# Patient Record
Sex: MALE | Race: WHITE | NOT HISPANIC OR LATINO | Employment: FULL TIME | URBAN - METROPOLITAN AREA
[De-identification: names, ages, dates, MRNs, and addresses within clinical notes are randomized per-mention and may not be internally consistent; named-entity substitution may affect disease eponyms.]

---

## 2017-02-09 ENCOUNTER — OFFICE VISIT (OUTPATIENT)
Dept: SURGERY | Facility: CLINIC | Age: 22
End: 2017-02-09

## 2017-02-09 VITALS
OXYGEN SATURATION: 99 % | BODY MASS INDEX: 16.25 KG/M2 | SYSTOLIC BLOOD PRESSURE: 112 MMHG | HEIGHT: 74 IN | DIASTOLIC BLOOD PRESSURE: 60 MMHG | WEIGHT: 126.6 LBS | HEART RATE: 75 BPM

## 2017-02-09 DIAGNOSIS — R10.84 GENERALIZED ABDOMINAL PAIN: ICD-10-CM

## 2017-02-09 DIAGNOSIS — R11.15 NON-INTRACTABLE CYCLICAL VOMITING WITH NAUSEA: Primary | ICD-10-CM

## 2017-02-09 PROCEDURE — 99213 OFFICE O/P EST LOW 20 MIN: CPT | Performed by: SURGERY

## 2017-02-09 NOTE — PROGRESS NOTES
Chief complaint:  Post-op  Follow up    History of Present Illness    This is Agapito Briseno 21 y.o. status post *** and is doing very well.  Patient denies fever, chills, nausea or vomiting.  Patient's pain is well-controlled.      The following portions of the patient's history were reviewed and updated as appropriate: allergies, current medications, past family history, past medical history, past social history, past surgical history and problem list.    Physical Exam  Incision is well-healed without evidence of {No wound infection:69251}.    Patient does not use tobacco products currently and I have counseled the patient not to start using tobacco products in the future.    Assessment/plan:    This is Agapito Briseno 21 y.o. status post *** and is doing very well.  I have instructed the patient not lift greater than 10 pounds for total of 6 weeks from the time of surgery. I have instructed the patient follow-up as needed.    Jaqueline Marc MD  General, Minimally Invasive and Endoscopic Surgery  [facility]    [facility], [address]  [place], [place], [address]  P: [phone]  F: [phone]    Cc:  Jw Jenkins MD

## 2017-02-13 ENCOUNTER — HOSPITAL ENCOUNTER (OUTPATIENT)
Dept: CT IMAGING | Facility: HOSPITAL | Age: 22
Discharge: HOME | End: 2017-02-13
Attending: SURGERY

## 2017-02-14 ENCOUNTER — RESULTS ENCOUNTER (OUTPATIENT)
Dept: SURGERY | Facility: CLINIC | Age: 22
End: 2017-02-14

## 2017-02-14 DIAGNOSIS — R11.15 NON-INTRACTABLE CYCLICAL VOMITING WITH NAUSEA: ICD-10-CM

## 2017-02-22 NOTE — PROGRESS NOTES
"Chief complaint: Abdominal pain, nausea and vomiting     Patient is a 21 y.o. male referred by Jw Jenkins MD for evaluation of persistent nausea and abdominal pain.  Patient has had an upper and lower scope in the past which was negative.  Patient has had a laparoscopic cholecystectomy August 23, 2016 for biliary dyskinesia.  Patient is on omeprazole and has tried Nexium and Carafate nothing seems to help.  Pain is worse at night and nausea.  Phenergan is the only thing that seems to help.  Patient denies ever having a CAT scan of the abdomen or pelvis.  Patient denies fever, chills, or previous history of cancer.     Past Medical History   Diagnosis Date   • Back pain    • Constipation    • GERD (gastroesophageal reflux disease)    • Nauseated      Past Surgical History   Procedure Laterality Date   • Cholecystectomy       Family History   Problem Relation Age of Onset   • Thyroid disease Mother    • Hypertension Father      Social History   Substance Use Topics   • Smoking status: Current Every Day Smoker   • Smokeless tobacco: Never Used   • Alcohol use No         Current Outpatient Prescriptions:   •  esomeprazole (NexIUM) 40 MG capsule, Take 1 capsule by mouth 2 (two) times a day., Disp: 180 capsule, Rfl: 3  •  promethazine (PHENERGAN) 25 MG tablet, Take 1 tablet by mouth Every 6 (Six) Hours As Needed for nausea or vomiting., Disp: 20 tablet, Rfl: 2    Review of Systems   Same as previous office visit  All other systems have been reviewed and are negative.  Vitals:    02/09/17 1506   BP: 112/60   Pulse: 75   SpO2: 99%   Weight: 126 lb 9.6 oz (57.4 kg)   Height: 74\" (188 cm)       Physical Exam  General/physcological:   Alert and oriented x3 in no acute distress  HEENT: Normal cephalic, atraumatic, PERRLA, EOMI, sclera anicteric, moist mucous membranes, neck is supple, no JVD, no carotid bruits, no thyromegaly no adenopathy  Respiratory: CTA and percussion  CVA: RRR, normal S1-S2, no murmurs, no " gallops or rubs  GI: Positive BS, soft, nondistended, nontender, no rebound, no guarding, no hernias, no organomegaly and no masses  Musculoskeletal: Full range of motion, no clubbing, no cyanosis or edema  Neurovascular: Grossly intact    Patient does use tobacco products currently and I have counseled the patient to stop using tobacco products.    Assessment:  Abdominal pain and nausea  Plan:  I will further evaluate with a CT scan of the abdomen and pelvis.  I have discussed this with the patient in detail.  Further recommendations will be made upon the results of the CAT scan.    Jaqueline Marc MD  General, Minimally Invasive and Endoscopic Surgery  [facility]    [address], [address]  [place], [place], [address]  P: [phone]  F: [phone]    Cc:  Jw Jenkins MD

## 2022-07-22 ENCOUNTER — TELEPHONE (OUTPATIENT)
Dept: FAMILY MEDICINE CLINIC | Facility: CLINIC | Age: 27
End: 2022-07-22

## 2022-07-22 RX ORDER — ESOMEPRAZOLE MAGNESIUM 40 MG/1
40 CAPSULE, DELAYED RELEASE ORAL
Qty: 180 CAPSULE | Refills: 3 | OUTPATIENT
Start: 2022-07-22

## 2022-07-22 NOTE — TELEPHONE ENCOUNTER
Caller: Agapito Briseno    Relationship: Self    Best call back number: [phone]    Requested Prescriptions:   Requested Prescriptions     Pending Prescriptions Disp Refills   • esomeprazole (nexIUM) 40 MG capsule 180 capsule 3     Sig: Take 1 capsule by mouth.        Pharmacy where request should be sent: [facility]/PHARMACY #[id number] [place] [id number] [address] - [phone]  - [phone]     Additional details provided by patient: PATIENT HAS ONE DAY LEFT.  Does the patient have less than a 3 day supply:  [x] Yes  [] No    Jeanna Navarro Rep   07/22/22 11:53 EDT

## 2022-10-04 ENCOUNTER — OFFICE VISIT (OUTPATIENT)
Dept: FAMILY MEDICINE CLINIC | Facility: CLINIC | Age: 27
End: 2022-10-04

## 2022-10-04 VITALS
HEIGHT: 74 IN | BODY MASS INDEX: 15.4 KG/M2 | HEART RATE: 70 BPM | WEIGHT: 120 LBS | SYSTOLIC BLOOD PRESSURE: 118 MMHG | OXYGEN SATURATION: 98 % | DIASTOLIC BLOOD PRESSURE: 82 MMHG

## 2022-10-04 DIAGNOSIS — F41.9 ANXIETY AND DEPRESSION: ICD-10-CM

## 2022-10-04 DIAGNOSIS — J01.00 ACUTE NON-RECURRENT MAXILLARY SINUSITIS: Primary | ICD-10-CM

## 2022-10-04 DIAGNOSIS — K21.9 CHRONIC GERD: ICD-10-CM

## 2022-10-04 DIAGNOSIS — F32.A ANXIETY AND DEPRESSION: ICD-10-CM

## 2022-10-04 PROCEDURE — 99214 OFFICE O/P EST MOD 30 MIN: CPT | Performed by: PHYSICIAN ASSISTANT

## 2022-10-04 RX ORDER — AMOXICILLIN AND CLAVULANATE POTASSIUM 875; 125 MG/1; MG/1
1 TABLET, FILM COATED ORAL 2 TIMES DAILY
Qty: 20 TABLET | Refills: 0 | Status: SHIPPED | OUTPATIENT
Start: 2022-10-04 | End: 2022-12-09

## 2022-10-04 RX ORDER — ESOMEPRAZOLE MAGNESIUM 40 MG/1
40 CAPSULE, DELAYED RELEASE ORAL
Qty: 90 CAPSULE | Refills: 3 | Status: SHIPPED | OUTPATIENT
Start: 2022-10-04 | End: 2022-12-09 | Stop reason: SDUPTHER

## 2022-10-04 RX ORDER — ESCITALOPRAM OXALATE 10 MG/1
10 TABLET ORAL DAILY
Qty: 30 TABLET | Refills: 1 | Status: SHIPPED | OUTPATIENT
Start: 2022-10-04

## 2022-10-07 NOTE — ASSESSMENT & PLAN NOTE
Patient's depression is recurrent and is mild without psychosis. Their depression is currently active and the condition is worsening. This will be reassessed in 4 weeks. F/U as described:patient was prescribed an antidepressant medicine.  Patient prescribed Lexapro discussed possible side effects and patient knowledge understanding

## 2022-10-07 NOTE — ASSESSMENT & PLAN NOTE
Patient prescribed Augmentin and advised him to continue to take over-the-counter medication for relief of symptoms.  Call if any problems arise

## 2022-10-07 NOTE — PROGRESS NOTES
"Chief Complaint  Sinusitis and Facial Pain    Subjective        Agapito Briseno presents to [facility]  History of Present Illness  Patient reports today secondary to having left-sided gum pain patient reports going to the dentist and he was advised that it is not a dental problem likely sinusitis.  Patient reports he is now having pain to the left gum and cheek area.  Patient reports he was having cough and drainage however not in the past few days.    Patient reports needing a refill of acid reflux medication.    Patient reports a history of anxiety and depression.  States his irritability has increased and he would like to restart medication.  Patient does not know what medication he took in the past however states it worked and then he discontinued after he felt better.  Patient denies any suicidal thoughts      Objective   Vital Signs:  /82 (BP Location: Left arm, Patient Position: Sitting, Cuff Size: Adult)   Pulse 70   Ht 188 cm (74\")   Wt 54.4 kg (120 lb)   SpO2 98%   BMI 15.41 kg/m²   Estimated body mass index is 15.41 kg/m² as calculated from the following:    Height as of this encounter: 188 cm (74\").    Weight as of this encounter: 54.4 kg (120 lb).          Physical Exam  Vitals and nursing note reviewed.   Constitutional:       General: He is not in acute distress.     Appearance: He is not ill-appearing.   HENT:      Head: Normocephalic.      Right Ear: Tympanic membrane, ear canal and external ear normal.      Left Ear: Tympanic membrane, ear canal and external ear normal.      Nose: Congestion present.      Right Turbinates: Swollen.      Left Turbinates: Swollen.      Right Sinus: Maxillary sinus tenderness present.      Left Sinus: Maxillary sinus tenderness present.      Mouth/Throat:      Pharynx: Posterior oropharyngeal erythema present. No oropharyngeal exudate.   Eyes:      Pupils: Pupils are equal, round, and reactive to light.   Cardiovascular:     "  Rate and Rhythm: Normal rate and regular rhythm.   Pulmonary:      Effort: Pulmonary effort is normal. No respiratory distress.   Musculoskeletal:         General: Normal range of motion.   Skin:     General: Skin is warm and dry.   Psychiatric:         Mood and Affect: Mood normal.        Result Review :                Assessment and Plan   Diagnoses and all orders for this visit:    1. Acute non-recurrent maxillary sinusitis (Primary)  Assessment & Plan:  Patient prescribed Augmentin and advised him to continue to take over-the-counter medication for relief of symptoms.  Call if any problems arise    Orders:  -     amoxicillin-clavulanate (Augmentin) 875-125 MG per tablet; Take 1 tablet by mouth 2 (Two) Times a Day.  Dispense: 20 tablet; Refill: 0    2. 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 Their depression is currently active and the condition is worsening. This will be reassessed in 4 weeks. F/U as described:patient was prescribed an antidepressant medicine.  Patient prescribed Lexapro discussed possible side effects and patient knowledge understanding    Orders:  -     escitalopram (Lexapro) 10 MG tablet; Take 1 tablet by mouth Daily. For mood  Dispense: 30 tablet; Refill: 1    3. Chronic GERD  Assessment & Plan:  Refill patient's medication    Orders:  -     esomeprazole (nexIUM) 40 MG capsule; Take 1 capsule by mouth Every Morning Before Breakfast.  Dispense: 90 capsule; Refill: 3           Follow Up   No follow-ups on file.  Patient was given instructions and counseling regarding his condition or for health maintenance advice. Please see specific information pulled into the AVS if appropriate.

## 2022-12-09 ENCOUNTER — OFFICE VISIT (OUTPATIENT)
Dept: FAMILY MEDICINE CLINIC | Facility: CLINIC | Age: 27
End: 2022-12-09

## 2022-12-09 VITALS
HEIGHT: 74 IN | SYSTOLIC BLOOD PRESSURE: 114 MMHG | BODY MASS INDEX: 15.92 KG/M2 | DIASTOLIC BLOOD PRESSURE: 80 MMHG | WEIGHT: 124 LBS

## 2022-12-09 DIAGNOSIS — M54.2 CHRONIC CERVICAL PAIN: ICD-10-CM

## 2022-12-09 DIAGNOSIS — G89.29 CHRONIC CERVICAL PAIN: ICD-10-CM

## 2022-12-09 DIAGNOSIS — Z00.00 PHYSICAL EXAM: Primary | ICD-10-CM

## 2022-12-09 DIAGNOSIS — F32.A ANXIETY AND DEPRESSION: ICD-10-CM

## 2022-12-09 DIAGNOSIS — F41.9 ANXIETY AND DEPRESSION: ICD-10-CM

## 2022-12-09 DIAGNOSIS — K21.9 CHRONIC GERD: ICD-10-CM

## 2022-12-09 PROCEDURE — 99213 OFFICE O/P EST LOW 20 MIN: CPT | Performed by: PHYSICIAN ASSISTANT

## 2022-12-09 PROCEDURE — 99395 PREV VISIT EST AGE 18-39: CPT | Performed by: PHYSICIAN ASSISTANT

## 2022-12-09 RX ORDER — NABUMETONE 500 MG/1
500 TABLET, FILM COATED ORAL 2 TIMES DAILY PRN
Qty: 30 TABLET | Refills: 5 | Status: SHIPPED | OUTPATIENT
Start: 2022-12-09

## 2022-12-09 RX ORDER — ESOMEPRAZOLE MAGNESIUM 40 MG/1
40 CAPSULE, DELAYED RELEASE ORAL
Qty: 90 CAPSULE | Refills: 3 | Status: SHIPPED | OUTPATIENT
Start: 2022-12-09

## 2022-12-09 RX ORDER — BACLOFEN 10 MG/1
10 TABLET ORAL
Qty: 30 TABLET | Refills: 5 | Status: SHIPPED | OUTPATIENT
Start: 2022-12-09

## 2022-12-10 LAB
ALBUMIN SERPL-MCNC: 5.1 G/DL (ref 4.1–5.2)
ALBUMIN/GLOB SERPL: 2.4 {RATIO} (ref 1.2–2.2)
ALP SERPL-CCNC: 53 IU/L (ref 44–121)
ALT SERPL-CCNC: 25 IU/L (ref 0–44)
AST SERPL-CCNC: 24 IU/L (ref 0–40)
BASOPHILS # BLD AUTO: 0.1 X10E3/UL (ref 0–0.2)
BASOPHILS NFR BLD AUTO: 1 %
BILIRUB SERPL-MCNC: 0.5 MG/DL (ref 0–1.2)
BUN SERPL-MCNC: 16 MG/DL (ref 6–20)
BUN/CREAT SERPL: 14 (ref 9–20)
CALCIUM SERPL-MCNC: 10.2 MG/DL (ref 8.7–10.2)
CHLORIDE SERPL-SCNC: 100 MMOL/L (ref 96–106)
CHOLEST SERPL-MCNC: 214 MG/DL (ref 100–199)
CO2 SERPL-SCNC: 26 MMOL/L (ref 20–29)
CREAT SERPL-MCNC: 1.12 MG/DL (ref 0.76–1.27)
EGFRCR SERPLBLD CKD-EPI 2021: 91 ML/MIN/1.73
EOSINOPHIL # BLD AUTO: 0.5 X10E3/UL (ref 0–0.4)
EOSINOPHIL NFR BLD AUTO: 8 %
ERYTHROCYTE [DISTWIDTH] IN BLOOD BY AUTOMATED COUNT: 12.7 % (ref 11.6–15.4)
GLOBULIN SER CALC-MCNC: 2.1 G/DL (ref 1.5–4.5)
GLUCOSE SERPL-MCNC: 86 MG/DL (ref 70–99)
HCT VFR BLD AUTO: 49.5 % (ref 37.5–51)
HDLC SERPL-MCNC: 52 MG/DL
HGB BLD-MCNC: 16.7 G/DL (ref 13–17.7)
IMM GRANULOCYTES # BLD AUTO: 0 X10E3/UL (ref 0–0.1)
IMM GRANULOCYTES NFR BLD AUTO: 0 %
LDLC SERPL CALC-MCNC: 146 MG/DL (ref 0–99)
LYMPHOCYTES # BLD AUTO: 1.7 X10E3/UL (ref 0.7–3.1)
LYMPHOCYTES NFR BLD AUTO: 32 %
MCH RBC QN AUTO: 29.9 PG (ref 26.6–33)
MCHC RBC AUTO-ENTMCNC: 33.7 G/DL (ref 31.5–35.7)
MCV RBC AUTO: 89 FL (ref 79–97)
MONOCYTES # BLD AUTO: 0.4 X10E3/UL (ref 0.1–0.9)
MONOCYTES NFR BLD AUTO: 8 %
NEUTROPHILS # BLD AUTO: 2.8 X10E3/UL (ref 1.4–7)
NEUTROPHILS NFR BLD AUTO: 51 %
PLATELET # BLD AUTO: 231 X10E3/UL (ref 150–450)
POTASSIUM SERPL-SCNC: 5.5 MMOL/L (ref 3.5–5.2)
PROT SERPL-MCNC: 7.2 G/DL (ref 6–8.5)
RBC # BLD AUTO: 5.58 X10E6/UL (ref 4.14–5.8)
SODIUM SERPL-SCNC: 140 MMOL/L (ref 134–144)
TRIGL SERPL-MCNC: 91 MG/DL (ref 0–149)
TSH SERPL DL<=0.005 MIU/L-ACNC: 3.39 UIU/ML (ref 0.45–4.5)
VLDLC SERPL CALC-MCNC: 16 MG/DL (ref 5–40)
WBC # BLD AUTO: 5.5 X10E3/UL (ref 3.4–10.8)

## 2022-12-13 NOTE — ASSESSMENT & PLAN NOTE
Discussed injury prevention, diet and exercise, safe sexual practices, and screening for common diseases. Encouraged use of sunscreen and seatbelts. Discussed timing of colon cancer cancer screening, prostate cancer screening, and review of skin for lesions. Avoidance of tobacco encouraged. Limitation or avoidance of alcohol encouraged. Recommend yearly dental and eye exams. Also discussed monitoring of blood pressure and lipids.

## 2022-12-13 NOTE — PROGRESS NOTES
"Chief Complaint  Annual Exam (Patient is here for an annual exam and fasting labs)    Subjective        Agapito Briseno presents to [facility]  History of Present Illness  Patient reports today for physical exam and fasting blood work.    Patient reports he was prescribed medication for anxiety and depression for around 4 to 6 weeks ago however stated he only started it 2 weeks ago.  Patient reports no change at this time.    Patient states he has acid reflux and would like refill of his medication.    Patient reports having chronic neck pain states he has taken medication in the past that helped and would like refills.      Objective   Vital Signs:  /80 (BP Location: Left arm, Patient Position: Sitting, Cuff Size: Adult)   Ht 188 cm (74\")   Wt 56.2 kg (124 lb)   BMI 15.92 kg/m²   Estimated body mass index is 15.92 kg/m² as calculated from the following:    Height as of this encounter: 188 cm (74\").    Weight as of this encounter: 56.2 kg (124 lb).          Physical Exam  Vitals and nursing note reviewed.   Constitutional:       General: He is not in acute distress.     Appearance: He is not ill-appearing.   HENT:      Head: Normocephalic.      Right Ear: Tympanic membrane, ear canal and external ear normal.      Left Ear: Tympanic membrane, ear canal and external ear normal.      Nose: Nose normal.      Mouth/Throat:      Mouth: Mucous membranes are moist.   Eyes:      Pupils: Pupils are equal, round, and reactive to light.   Cardiovascular:      Rate and Rhythm: Normal rate and regular rhythm.      Pulses: Normal pulses.   Pulmonary:      Effort: Pulmonary effort is normal. No respiratory distress.   Abdominal:      General: Bowel sounds are normal.      Palpations: Abdomen is soft.      Tenderness: There is no abdominal tenderness. There is no guarding or rebound.   Musculoskeletal:         General: Tenderness present. Normal range of motion.      Cervical back: Normal " range of motion.   Skin:     General: Skin is warm and dry.      Capillary Refill: Capillary refill takes less than 2 seconds.   Neurological:      General: No focal deficit present.      Mental Status: He is oriented to person, place, and time.   Psychiatric:         Mood and Affect: Mood normal.        Result Review :{Labs  Result Review  Imaging  Med Tab  Media  Procedures  :23}                Assessment and Plan   Diagnoses and all orders for this visit:    1. Physical exam (Primary)  Assessment & Plan:  Discussed injury prevention, diet and exercise, safe sexual practices, and screening for common diseases. Encouraged use of sunscreen and seatbelts. Discussed timing of colon cancer cancer screening, prostate cancer screening, and review of skin for lesions. Avoidance of tobacco encouraged. Limitation or avoidance of alcohol encouraged. Recommend yearly dental and eye exams. Also discussed monitoring of blood pressure and lipids.       Orders:  -     CBC & Differential; Future  -     Comprehensive Metabolic Panel; Future  -     TSH; Future  -     Lipid Panel; Future  -     CBC & Differential  -     Comprehensive Metabolic Panel  -     TSH  -     Lipid Panel    2. Chronic GERD  Assessment & Plan:  Continue current treatment plan refill patient's medication    Orders:  -     esomeprazole (nexIUM) 40 MG capsule; Take 1 capsule by mouth Every Morning Before Breakfast.  Dispense: 90 capsule; Refill: 3    3. Anxiety and depression  Assessment & Plan:  Given patient has only been on the medication for 2 weeks advised him to continue current treatment plan and return to clinic in 4 weeks for follow-up      4. Chronic cervical pain  Assessment & Plan:  Patient prescribed baclofen and nabumetone for possible relief recommended stretching, ice and heat.    Orders:  -     baclofen (LIORESAL) 10 MG tablet; Take 1 tablet by mouth every night at bedtime. Caution sedation  Dispense: 30 tablet; Refill: 5  -     nabumetone  (RELAFEN) 500 MG tablet; Take 1 tablet by mouth 2 (Two) Times a Day As Needed for Mild Pain or Moderate Pain. With food  Dispense: 30 tablet; Refill: 5  -     XR Spine Cervical 2 or 3 View; Future           Follow Up   No follow-ups on file.  Patient was given instructions and counseling regarding his condition or for health maintenance advice. Please see specific information pulled into the AVS if appropriate.

## 2022-12-13 NOTE — ASSESSMENT & PLAN NOTE
Patient prescribed baclofen and nabumetone for possible relief recommended stretching, ice and heat.

## 2023-08-10 NOTE — ASSESSMENT & PLAN NOTE
Given patient has only been on the medication for 2 weeks advised him to continue current treatment plan and return to clinic in 4 weeks for follow-up   Staff at group home Mother